# Patient Record
Sex: MALE | Race: WHITE | URBAN - METROPOLITAN AREA
[De-identification: names, ages, dates, MRNs, and addresses within clinical notes are randomized per-mention and may not be internally consistent; named-entity substitution may affect disease eponyms.]

---

## 2023-04-13 ENCOUNTER — TELEPHONE (OUTPATIENT)
Facility: CLINIC | Age: 38
End: 2023-04-13

## 2023-04-13 NOTE — TELEPHONE ENCOUNTER
No RN in clinic to phone pt today. I have not met this patient but called just as a general recommendation that if he is having severe abdominal pain or flank pain he should seek care at PCP/urgent care/ER, I cannot address that in our visit for diabetes concern. He states understanding and says 'I think i'll be okay', and has OV scheduled for 4/17.  Will close encounter

## 2023-04-13 NOTE — TELEPHONE ENCOUNTER
Pts wife Maida Butler phoned clinic to schedule an appt. States  is experiencing constant pain in his left flank. They purchased a glucometer yesterday and began checking his BG. Was experiencing blurred vision but has since gone away. Has athletes foot and is getting worse despite treatment (OTC creams). Yesterday checked his fasting BG was at 136 and he had a meal, pork chop asparagus, two hours later BG was 165. Fasted this AM checked and was 201. Pts wife states he has not been seen by any health provider in years likely since childhood. No PCP. Not officially diagnosed with diabetes but runs in the family. Scheduled pt with EMERSON Becerra on 4/17 at 2:45 pm but may need to reschedule to 4/19. Call back number is [phone].

## 2023-04-17 ENCOUNTER — OFFICE VISIT (OUTPATIENT)
Facility: CLINIC | Age: 38
End: 2023-04-17
Payer: COMMERCIAL

## 2023-04-17 ENCOUNTER — LAB ENCOUNTER (OUTPATIENT)
Dept: LAB | Facility: HOSPITAL | Age: 38
End: 2023-04-17
Payer: COMMERCIAL

## 2023-04-17 VITALS
SYSTOLIC BLOOD PRESSURE: 142 MMHG | WEIGHT: 267 LBS | HEART RATE: 78 BPM | DIASTOLIC BLOOD PRESSURE: 80 MMHG | OXYGEN SATURATION: 98 %

## 2023-04-17 DIAGNOSIS — E11.65 TYPE 2 DIABETES MELLITUS WITH HYPERGLYCEMIA, WITHOUT LONG-TERM CURRENT USE OF INSULIN (HCC): ICD-10-CM

## 2023-04-17 DIAGNOSIS — E11.65 TYPE 2 DIABETES MELLITUS WITH HYPERGLYCEMIA, WITHOUT LONG-TERM CURRENT USE OF INSULIN (HCC): Primary | ICD-10-CM

## 2023-04-17 LAB
ALBUMIN SERPL-MCNC: 4.3 G/DL (ref 3.4–5)
ALBUMIN/GLOB SERPL: 1.2 {RATIO} (ref 1–2)
ALP LIVER SERPL-CCNC: 76 U/L
ALT SERPL-CCNC: 27 U/L
ANION GAP SERPL CALC-SCNC: 4 MMOL/L (ref 0–18)
AST SERPL-CCNC: 18 U/L (ref 15–37)
BILIRUB SERPL-MCNC: 0.7 MG/DL (ref 0.1–2)
BUN BLD-MCNC: 10 MG/DL (ref 7–18)
CALCIUM BLD-MCNC: 9.4 MG/DL (ref 8.5–10.1)
CARTRIDGE LOT#: ABNORMAL NUMERIC
CHLORIDE SERPL-SCNC: 106 MMOL/L (ref 98–112)
CHOLEST SERPL-MCNC: 163 MG/DL (ref ?–200)
CO2 SERPL-SCNC: 27 MMOL/L (ref 21–32)
CREAT BLD-MCNC: 0.95 MG/DL
CREAT UR-SCNC: 126 MG/DL
ERYTHROCYTE [DISTWIDTH] IN BLOOD BY AUTOMATED COUNT: 12.2 %
GFR SERPLBLD BASED ON 1.73 SQ M-ARVRAT: 107 ML/MIN/1.73M2 (ref 60–?)
GLOBULIN PLAS-MCNC: 3.7 G/DL (ref 2.8–4.4)
GLUCOSE BLD-MCNC: 117 MG/DL (ref 70–99)
HCT VFR BLD AUTO: 44.3 %
HDLC SERPL-MCNC: 37 MG/DL (ref 40–59)
HEMOGLOBIN A1C: 7.9 % (ref 4.3–5.6)
HGB BLD-MCNC: 15.9 G/DL
LDLC SERPL CALC-MCNC: 101 MG/DL (ref ?–100)
MCH RBC QN AUTO: 30.1 PG (ref 26–34)
MCHC RBC AUTO-ENTMCNC: 35.9 G/DL (ref 31–37)
MCV RBC AUTO: 83.7 FL
MICROALBUMIN UR-MCNC: 0.75 MG/DL
MICROALBUMIN/CREAT 24H UR-RTO: 6 UG/MG (ref ?–30)
NONHDLC SERPL-MCNC: 126 MG/DL (ref ?–130)
OSMOLALITY SERPL CALC.SUM OF ELEC: 284 MOSM/KG (ref 275–295)
PLATELET # BLD AUTO: 232 10(3)UL (ref 150–450)
POTASSIUM SERPL-SCNC: 3.9 MMOL/L (ref 3.5–5.1)
PROT SERPL-MCNC: 8 G/DL (ref 6.4–8.2)
RBC # BLD AUTO: 5.29 X10(6)UL
SODIUM SERPL-SCNC: 137 MMOL/L (ref 136–145)
TRIGL SERPL-MCNC: 139 MG/DL (ref 30–149)
TSI SER-ACNC: 3.32 MIU/ML (ref 0.36–3.74)
VLDLC SERPL CALC-MCNC: 23 MG/DL (ref 0–30)
WBC # BLD AUTO: 7.6 X10(3) UL (ref 4–11)

## 2023-04-17 PROCEDURE — 82570 ASSAY OF URINE CREATININE: CPT

## 2023-04-17 PROCEDURE — 80053 COMPREHEN METABOLIC PANEL: CPT

## 2023-04-17 PROCEDURE — 82043 UR ALBUMIN QUANTITATIVE: CPT

## 2023-04-17 PROCEDURE — 84443 ASSAY THYROID STIM HORMONE: CPT

## 2023-04-17 PROCEDURE — 36415 COLL VENOUS BLD VENIPUNCTURE: CPT

## 2023-04-17 PROCEDURE — 85027 COMPLETE CBC AUTOMATED: CPT

## 2023-04-17 PROCEDURE — 80061 LIPID PANEL: CPT

## 2023-04-17 PROCEDURE — 3061F NEG MICROALBUMINURIA REV: CPT | Performed by: NURSE PRACTITIONER

## 2023-05-02 ENCOUNTER — OFFICE VISIT (OUTPATIENT)
Dept: FAMILY MEDICINE CLINIC | Facility: CLINIC | Age: 38
End: 2023-05-02
Payer: COMMERCIAL

## 2023-05-02 VITALS
WEIGHT: 275.63 LBS | OXYGEN SATURATION: 98 % | BODY MASS INDEX: 39.46 KG/M2 | DIASTOLIC BLOOD PRESSURE: 88 MMHG | HEIGHT: 70 IN | TEMPERATURE: 97 F | HEART RATE: 69 BPM | SYSTOLIC BLOOD PRESSURE: 138 MMHG

## 2023-05-02 DIAGNOSIS — R19.09 ABDOMINAL MASS OF OTHER SITE: Primary | ICD-10-CM

## 2023-05-02 DIAGNOSIS — R03.0 ELEVATED BP WITHOUT DIAGNOSIS OF HYPERTENSION: ICD-10-CM

## 2023-05-02 PROBLEM — E11.9 DIABETES TYPE 2, CONTROLLED (HCC): Status: ACTIVE | Noted: 2023-05-02

## 2023-05-02 PROCEDURE — 3079F DIAST BP 80-89 MM HG: CPT | Performed by: NURSE PRACTITIONER

## 2023-05-02 PROCEDURE — 3075F SYST BP GE 130 - 139MM HG: CPT | Performed by: NURSE PRACTITIONER

## 2023-05-02 PROCEDURE — 3008F BODY MASS INDEX DOCD: CPT | Performed by: NURSE PRACTITIONER

## 2023-05-02 PROCEDURE — 99214 OFFICE O/P EST MOD 30 MIN: CPT | Performed by: NURSE PRACTITIONER

## 2023-05-02 NOTE — PROGRESS NOTES
Subjective:   Patient ID: Franchester Holstein is a 30year old male. Jennifer Vance is here today to establish care. He had blurry vision, which led him to endocrinology d/t an A1C of 7.9%. Has adjusted lifestyle and diet. Has appointment with endo in July of 2023. Left side lower abdominal mass. Began 5 months ago. Described as tender. 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 Denies recent falls or changes in bowel patterns. History/Other:   Review of Systems   All other systems reviewed and are negative. No current outpatient medications on file. Allergies:No Known Allergies    Objective:   Physical Exam  Constitutional:       Appearance: Normal appearance. He is obese. Eyes:      General: No scleral icterus. Pupils: Pupils are equal, round, and reactive to light. Cardiovascular:      Rate and Rhythm: Normal rate and regular rhythm. Heart sounds: Normal heart sounds. No murmur heard. Pulmonary:      Effort: Pulmonary effort is normal.      Breath sounds: Normal breath sounds. No wheezing. Abdominal:      General: There is no distension. Palpations: Abdomen is soft. There is mass. Tenderness: There is abdominal tenderness. There is no right CVA tenderness, left CVA tenderness, guarding or rebound. Hernia: No hernia is present. Comments: Left oblique with oblong semi-firm mass, well-defined borders. Tender. Neurological:      Mental Status: He is alert and oriented to person, place, and time. Psychiatric:         Mood and Affect: Mood normal.         Behavior: Behavior normal.         Thought Content: Thought content normal.         Judgment: Judgment normal.         Assessment & Plan:      1. Abdominal mass of other site  Hernia vs. Hypertrophy of muscle  - US ABDOMEN LIMITED (CPT=76705); Future    2.  Elevated BP without diagnosis of hypertension  Check blood pressures when at grocery store or buy at home cuff  Follow-up in 1 month, earlier if worsening bp level  Continue lifestyle modifications

## 2023-06-07 ENCOUNTER — HOSPITAL ENCOUNTER (OUTPATIENT)
Dept: ULTRASOUND IMAGING | Age: 38
Discharge: HOME | End: 2023-06-07
Attending: NURSE PRACTITIONER
Payer: COMMERCIAL

## 2023-06-07 DIAGNOSIS — R19.09 ABDOMINAL MASS OF OTHER SITE: ICD-10-CM

## 2023-06-07 PROCEDURE — 76705 ECHO EXAM OF ABDOMEN: CPT | Performed by: NURSE PRACTITIONER

## 2023-07-06 ENCOUNTER — OFFICE VISIT (OUTPATIENT)
Facility: CLINIC | Age: 38
End: 2023-07-06
Payer: COMMERCIAL

## 2023-07-06 VITALS — OXYGEN SATURATION: 99 % | DIASTOLIC BLOOD PRESSURE: 80 MMHG | HEART RATE: 72 BPM | SYSTOLIC BLOOD PRESSURE: 130 MMHG

## 2023-07-06 DIAGNOSIS — E11.65 TYPE 2 DIABETES MELLITUS WITH HYPERGLYCEMIA, WITHOUT LONG-TERM CURRENT USE OF INSULIN (HCC): Primary | ICD-10-CM

## 2023-07-06 LAB
CARTRIDGE LOT#: NORMAL NUMERIC
HEMOGLOBIN A1C: 5.6 % (ref 4.3–5.6)

## 2023-07-06 PROCEDURE — 99214 OFFICE O/P EST MOD 30 MIN: CPT

## 2023-07-06 PROCEDURE — 3079F DIAST BP 80-89 MM HG: CPT

## 2023-07-06 PROCEDURE — 3044F HG A1C LEVEL LT 7.0%: CPT

## 2023-07-06 PROCEDURE — 83036 HEMOGLOBIN GLYCOSYLATED A1C: CPT

## 2023-07-06 PROCEDURE — 3075F SYST BP GE 130 - 139MM HG: CPT

## 2023-07-06 NOTE — PROGRESS NOTES
[facility]    Name: Ger Gutierrez    Date: 7/6/2023    HISTORY OF PRESENT ILLNESS   Ger Gutierrez is a 30year old male with no significant PMHx who presents for follow up evaluation for diabetes. Initial HPI consult in April 2023: No previous lab work done, has not been to primary care provider for all of adult life  A1C in office 7.9%; confirms T2DM dx  Diagnosed age: 30  Pt denies hx of hospitalization for DM and/or pancreatitis   Family history of DM: Mother  FBG at home have been 160s. Having blurred vision- hard to describe, but its like his eyes aren't focused  Has been having L flank pain- describes it as intermittent, dull, nothing makes it better/worse- it's been happening less lately  Notes that this started after eating a keto/high fat diet, now no longer eating keto    Current DM meds: no current meds  Previously trialed/failed: none    Diet recall: Mostly eats all at once in the evening, is fasting most of day, but states he eats a lot at night  Previously eating burgers, lots of carbs, meats/potatoes, lots of snacking through the night (chips, cookies)  Works 4a-4p, working 6 days/week, has 18mo old child at home    Blood Glucose log/CGM: reviewed   Checking 1 times per day  Morning/fasting: 160s    Interim hx:  July 2023- A1C 5.6% today. Changed diet, eating Chipotle bowls. Diet recall: Eating mostly meat, vegetables, hard boiled egg/cheese stick, mostly keto diet. Checking BG in AM, -120s, 2hPP 99 w/ chipotle, 5 [place] is 120s-140s. 90 day average is 130. Walking more with the dog. Considering starting going to the gym. Last A1c value was 5.6% done 7/6/2023. REVIEW OF SYSTEMS  Ten point review of systems has been performed and is otherwise negative and/or non-contributory, except as described above. Medications:   No current outpatient medications on file.      Allergies:   No Known Allergies    Social History:   Social History    Socioeconomic History Marital status: Single      Number of children: 1    Tobacco Use      Smoking status: Never      Smokeless tobacco: Never    Vaping Use      Vaping Use: Never used    Substance and Sexual Activity      Alcohol use: Never      Drug use: Never      Medical History:   No past medical history on file. Surgical history:   Past Surgical History:   Procedure Laterality Date    I&D PERIANAL ABSCESS,SUPERFICIAL           PHYSICAL EXAM   07/06/23  1014   BP: 130/80   Pulse: 72   SpO2: 99%       General Appearance:  alert, well developed, in no acute distress  Eyes:  normal conjunctivae, sclera  Respiratory:  breathing comfortably  Cardiovascular:  regular rate  Musculoskeletal:  normal muscle strength and tone  Skin:  normal moisture and skin texture  Hair & Nails:  normal scalp hair     Neuro:  sensory grossly intact and motor grossly intact  Psychiatric:  oriented to time, self, and place    ASSESSMENT/PLAN:    (E11.65) Type 2 diabetes mellitus with hyperglycemia, without long-term current use of insulin (HCC)  (primary encounter diagnosis)  Plan: HEMOGLOBIN A1C, HEMOGLOBIN A1C    A1C 5.6%! at goal <7% w/ just lifestyle adjustments. Further counseling on diet/exercise provided. Plan to f/u A1C 3 mo (no appt needed unless >7%), then will f/u in 6mo to track progress. We discussed the importance of glycemic control to prevent complications of diabetes. We discussed the importance of SBGM and consistent, low carb diet as well as moderate exercise as well.   We also discussed the complications of diabetes include retinopathy, neuropathy, nephropathy and cardiovascular disease.     - repeat A1C after 10/1 at any [facility] lab location  - advised to check BG once daily at alternating times of day  - incr exercise to 150min/week, carb controlled diet (aim for 45-60g carb/meal)  - DM education ordered LOV, politely declines today  - Ophtho: no DR, No data recorded No data recorded - reminded  - BP controlled, on no current antihypertensives  - LDL mildly elevated, lifestyle changes   - MAB neg  - Neuropathy/ Foot exam: No data recorded  - CAD: none     Return in about 6 months (around 1/6/2024) for recheck A1C at visit. Patient instructed to call sooner if they develop blood glucose readings <70 and/or if they have readings persistently >400. A total of 30 minutes was spent today on obtaining history, reviewing pertinent labs, evaluating patient, providing multiple treatment options, reinforcing diet/exercise and compliance, and completing documentation.      7/6/2023  EMERSON Romano

## 2024-01-03 ENCOUNTER — OFFICE VISIT (OUTPATIENT)
Facility: CLINIC | Age: 39
End: 2024-01-03
Payer: COMMERCIAL

## 2024-01-03 VITALS
WEIGHT: 250 LBS | OXYGEN SATURATION: 98 % | HEART RATE: 77 BPM | SYSTOLIC BLOOD PRESSURE: 126 MMHG | DIASTOLIC BLOOD PRESSURE: 78 MMHG | BODY MASS INDEX: 36 KG/M2

## 2024-01-03 DIAGNOSIS — E11.9 TYPE 2 DIABETES MELLITUS WITHOUT COMPLICATION, WITHOUT LONG-TERM CURRENT USE OF INSULIN (HCC): Primary | ICD-10-CM

## 2024-01-03 LAB
CARTRIDGE LOT#: NORMAL NUMERIC
GLUCOSE BLOOD: 120
HEMOGLOBIN A1C: 5.5 % (ref 4.3–5.6)

## 2024-01-03 PROCEDURE — 83036 HEMOGLOBIN GLYCOSYLATED A1C: CPT

## 2024-01-03 PROCEDURE — 82947 ASSAY GLUCOSE BLOOD QUANT: CPT

## 2024-01-03 PROCEDURE — 3074F SYST BP LT 130 MM HG: CPT

## 2024-01-03 PROCEDURE — 3078F DIAST BP <80 MM HG: CPT

## 2024-01-03 PROCEDURE — 99213 OFFICE O/P EST LOW 20 MIN: CPT

## 2024-01-03 PROCEDURE — 3044F HG A1C LEVEL LT 7.0%: CPT

## 2024-01-03 NOTE — PROGRESS NOTES
[facility]    Name: Kyle Holm    Date: 1/3/2024    HISTORY OF PRESENT ILLNESS   Kyle Holm is a 38 year old male with no significant PMHx who presents for follow up evaluation for diabetes.    Initial HPI consult in April 2023:  No previous lab work done, has not been to primary care provider for all of adult life  A1C in office 7.9%; confirms T2DM dx  Diagnosed age: 37  Pt denies hx of hospitalization for DM and/or pancreatitis   Family history of DM: Mother  FBG at home have been 160s. Having blurred vision- hard to describe, but its like his eyes aren't focused  Has been having L flank pain- describes it as intermittent, dull, nothing makes it better/worse- it's been happening less lately  Notes that this started after eating a keto/high fat diet, now no longer eating keto    Current DM meds: no current meds  Previously trialed/failed: none     Diet recall: Mostly eats all at once in the evening, is fasting most of day, but states he eats a lot at night  Previously eating burgers, lots of carbs, meats/potatoes, lots of snacking through the night (chips, cookies)  Works 4a-4p, working 6 days/week, has 18mo old child at home    Blood Glucose log/CGM: reviewed   Checking 1 times per day  Morning/fasting: 160s    Interim hx:  July 2023- A1C 5.6% today. Changed diet, eating Chipotle bowls. Diet recall: Eating mostly meat, vegetables, hard boiled egg/cheese stick, mostly keto diet. Checking BG in AM, -120s, 2hPP 99 w/ chipotle, 5 [place] is 120s-140s. 90 day average is 130. Walking more with the dog. Considering starting going to the gym.      Jan 2024-  Last A1c value was 5.5% done 1/3/2024.  Random , ate cereal last night before bed. He notes he is eating more carbs  Goal to exercise more in the coming year    Last A1c value was 5.5% done 1/3/2024.    REVIEW OF SYSTEMS  Ten point review of systems has been performed and is otherwise negative and/or non-contributory, except as  described above.     Medications:   No current outpatient medications on file.     Allergies:   No Known Allergies    Social History:   Social History     Socioeconomic History    Marital status: Single    Number of children: 1   Tobacco Use    Smoking status: Never    Smokeless tobacco: Never   Vaping Use    Vaping Use: Never used   Substance and Sexual Activity    Alcohol use: Never    Drug use: Never       Medical History:   History reviewed. No pertinent past medical history.    Surgical history:   Past Surgical History:   Procedure Laterality Date    I&D PERIANAL ABSCESS,SUPERFICIAL           PHYSICAL EXAM  Vitals:    01/03/24 1038   BP: 126/78   Pulse: 77   SpO2: 98%   Weight: 250 lb (113.4 kg)     General Appearance:  alert, well developed, in no acute distress  Eyes:  normal conjunctivae, sclera  Respiratory:  breathing comfortably  Musculoskeletal:  normal muscle strength and tone  Skin:  normal moisture and skin texture  Hair & Nails:  normal scalp hair     Neuro:  sensory grossly intact and motor grossly intact  Psychiatric:  oriented to time, self, and place  Diabetes foot exam performed: Bilateral foot exam w/ normal skin temperature and turgor. He has a healing callous on the R medial foot. Monofilament/sensation of bilateral feet is normal. Vibration to dorsum to the first toe perceived bilaterally. Bilateral dorsalis pedis +1. Capillary refill <3 seconds. Foot care practices reviewed with patient.      ASSESSMENT/PLAN:      ICD-10-CM    1. Type 2 diabetes mellitus without complication, without long-term current use of insulin ([facility])  E11.9 POC Hgb A1C     HemoCue Glucose 201 (Glucose blood test)          A1C 5.5%, at goal <7% w/ just lifestyle adjustments. Further counseling on diet/exercise provided. F/u in 6mo to track progress.   Discussed DC to PCP, prefers to follow here.    We discussed the importance of glycemic control to prevent complications of diabetes. We discussed the importance of SBGM  and consistent, low carb diet as well as moderate exercise as well.  We also discussed the complications of diabetes include retinopathy, neuropathy, nephropathy and cardiovascular disease.     - advised to check BG once daily at alternating times of day  - incr exercise to 150min/week, carb controlled diet (aim for 45-60g carb/meal)  - DM education ordered LOV, politely declines today    DM quality metrics:  - Ophtho: No data recorded No data recorded - reminded  - BP controlled, on no current antihypertensives  - LDL mildly elevated, lifestyle changes 4/2023  - MAB neg 4/2023   - Neuropathy/ Foot exam: Last Foot Exam: 01/03/24  - CAD: none     Return in about 6 months (around 7/3/2024) for DM follow up.    1/3/2024  EMERSON Mata

## 2024-01-03 NOTE — PATIENT INSTRUCTIONS
- complete your diabetes eye exam. This exam is critical to preventing and treating eye conditions caused by diabetes that could potentially lead to vision loss. Once complete, please call your eye care provider and ask them to fax your latest report to our office. This will help us update our records. Our fax number is: [phone]       Blood sugar targets:  Before breakfast: , 2 hours after meals: <180 (preferably <150), A1C goal: <7.0%    HOW TO TREAT LOW BLOOD SUGAR (Hypoglycemia)  Low blood sugar= Less than 70, or if you start to have symptoms (below)  Symptoms: Shaking or trembling, fast heart rate, extreme hunger, sweating, confusion/difficulty concentrating, dizziness.    How to treat a low blood sugar if you are able to eat/drink: The Rule of 15  If you are using continuous glucose monitor that says you are low, but you do not have any symptoms, verify on fingerstick that your blood sugar is actually low before treating.   Eat 15 grams of carbs (see examples below)  Check your blood sugar after 15 minutes. If it’s still below your target range, have another serving.   Repeat these steps until it’s in your target range. Once it’s in range, if you're nervous about your sugar going low again, have a protein source (ie, a spoonful of peanut butter).   If you have a CGM you want to look for how your arrow has changed. If you arrow is pointed up or sideways after 15 min, give your CGM more time OR check with a finger stick. Try not to eat more food until at least 15 min after the first BG check - otherwise you risk having a rebound high.  If you are experiencing symptoms and you are unable to check your blood glucose for any reason, treat the hypoglycemia.  If someone has a low blood sugar and is unconscious: Don’t hesitate to call 911. If someone is unconscious and glucagon is not available or someone does not know how to use it, call 911 immediately.    To treat a low, I recommend you carry with you  easy, pre-portioned treatment for low blood sugars that are 15G of carbs:   - Children sized squeeze pouch applesauce (high fiber + carbs help prevent too high of a spike)  - Small children's sized juicebox- 15g carb --> 4oz juice box  - Glucose tablets from [employer], you can find them near diabetes supplies --> Note, you will need to eat 3-4 tablets to get to 15g of carbs  - Children sized fruit snack pack- look for one with 15 grams of total carbohydrate  - 3-4 Starburst candies  - 1 pack of fun sized skittles  - Choice of how to treat your low is important. Complex carbs, or foods that containf ats along with carbs (like chocolate) can slow the absorption of glucose and should NOT be used to treat an emergency low      Diabetes annual care reminders:  - If you haven't, remember to complete a yearly diabetes eye exam. This exam is critical to preventing and treating eye conditions caused by diabetes that could potentially lead to vision loss. Once complete, please call your eye care provider and ask them to fax your latest report to our office. This will help us update our records. Our fax number is: [phone]  - If you ever have an upcoming surgery, please notify the office. We will make adjustments to your diabetes medications prior to the procedure.    Diet & Exercise:  Helpful apps for Carb Counting: remember, carb goal is 30-45g of carb per meal, plus 15-30g of carbs with snacks.   Increase your protein and fiber intake to feel cabrera, longer.   If you want more dietary guidance, let me know and we can get you scheduled with Danielle, our diabetes educator or a dietician who specializes in diabetes  [facility]- Calorie counter and diet tracker, [place] food search franny or www.calorieking.com, LoseIt!, Carb Manager  Nutrition analyzer: Copy and paste any recipe into this analyzer, it will give you an estimated carb count for homemade  recipes:  https://www.[place]/recipe-nutrition-analyzer-3359152    General exercise guidelines from the [employer]:  Recommendations for Adults: Get at least 150 minutes per week of moderate-intensity aerobic activity or 75 minutes per week of vigorous aerobic activity, or a combination of both, preferably spread throughout the week.  Add moderate- to high-intensity muscle-strengthening activity (such as resistance or weights) on at least 2 days per week.  Spend less time sitting. Even light-intensity activity can offset some of the risks of being sedentary. Taking a 10-15 minute walk after meals is very beneficial to blood sugar regulation.  Increase amount and intensity of exercise gradually over time.      Thank you for visiting our office. We look forward to working with you to reach your health goals. As a reminder, if you need refills, please request early so there is enough time to process the request. We ask that you provide at least 5 days' notice before a refill is due, so there is time to send a request to pharmacy, process the refill, and ensure there are no other problems with obtaining the medication (backorders, prior authorization paperwork, etc). Routine refills will not be addressed on weekends, so please submit these requests during the week.

## 2024-08-20 ENCOUNTER — TELEPHONE (OUTPATIENT)
Facility: CLINIC | Age: 39
End: 2024-08-20

## 2024-08-22 ENCOUNTER — OFFICE VISIT (OUTPATIENT)
Facility: CLINIC | Age: 39
End: 2024-08-22
Payer: COMMERCIAL

## 2024-08-22 VITALS
HEIGHT: 70 IN | SYSTOLIC BLOOD PRESSURE: 130 MMHG | HEART RATE: 63 BPM | BODY MASS INDEX: 37.8 KG/M2 | OXYGEN SATURATION: 99 % | WEIGHT: 264 LBS | DIASTOLIC BLOOD PRESSURE: 86 MMHG

## 2024-08-22 DIAGNOSIS — R73.03 PREDIABETES: Primary | ICD-10-CM

## 2024-08-22 LAB — HEMOGLOBIN A1C: 5.6 % (ref 4.3–5.6)

## 2024-08-22 PROCEDURE — 3044F HG A1C LEVEL LT 7.0%: CPT

## 2024-08-22 PROCEDURE — 3079F DIAST BP 80-89 MM HG: CPT

## 2024-08-22 PROCEDURE — 3075F SYST BP GE 130 - 139MM HG: CPT

## 2024-08-22 PROCEDURE — 99213 OFFICE O/P EST LOW 20 MIN: CPT

## 2024-08-22 PROCEDURE — 83036 HEMOGLOBIN GLYCOSYLATED A1C: CPT

## 2024-08-22 PROCEDURE — 3008F BODY MASS INDEX DOCD: CPT

## 2024-08-22 NOTE — PROGRESS NOTES
[facility]    Name: Kyle Holm    Date: 8/22/2024    HISTORY OF PRESENT ILLNESS   Kyle Holm is a 38 year old male with no significant PMHx who presents for follow up evaluation for diabetes.    Initial HPI consult in April 2023:  No previous lab work done, has not been to primary care provider for all of adult life  A1C in office 7.9%; confirms T2DM dx  Diagnosed age: 37  Pt denies hx of hospitalization for DM and/or pancreatitis   Family history of DM: Mother  FBG at home have been 160s. Having blurred vision- hard to describe, but its like his eyes aren't focused  Has been having L flank pain- describes it as intermittent, dull, nothing makes it better/worse- it's been happening less lately  Notes that this started after eating a keto/high fat diet, now no longer eating keto    Current DM meds: no current meds  Previously trialed/failed: none     Diet recall: Mostly eats all at once in the evening, is fasting most of day, but states he eats a lot at night  Previously eating burgers, lots of carbs, meats/potatoes, lots of snacking through the night (chips, cookies)  Works 4a-4p, working 6 days/week, has 18mo old child at home    Blood Glucose log/CGM: reviewed   Checking 1 times per day  Morning/fasting: 160s    Interim hx:  July 2023- A1C 5.6% today. Changed diet, eating Chipotle bowls. Diet recall: Eating mostly meat, vegetables, hard boiled egg/cheese stick, mostly keto diet. Checking BG in AM, -120s, 2hPP 99 w/ to[place], 5 [place] is 120s-140s. 90 day average is 130. Walking more with the dog. Considering starting going to the gym.      Jan 2024-  Last A1c value was 5.5% done 1/3/2024.  Random , ate cereal last night before bed. He notes he is eating more carbs  Goal to exercise more in the coming year    8/2024-w/ Mariam NORMAN.Last A1c value was 5.6% done 8/22/2024.  He notes now that wife is pregnant, eating some more snacks out of the usual. Eating more carbs.   Has been more  active this summer. Checks BG 1x/day usually- 130s  DM meds at visit: NONE    Objective:    REVIEW OF SYSTEMS  Ten point review of systems has been performed and is otherwise negative and/or non-contributory, except as described above.     Medications:   No current outpatient medications on file.     Allergies:   No Known Allergies    Social History:   Social History     Socioeconomic History    Marital status: Single    Number of children: 1   Tobacco Use    Smoking status: Never    Smokeless tobacco: Never   Vaping Use    Vaping status: Never Used   Substance and Sexual Activity    Alcohol use: Never    Drug use: Never       Medical History:   History reviewed. No pertinent past medical history.    Surgical history:   Past Surgical History:   Procedure Laterality Date    I&d perianal abscess,superficial           PHYSICAL EXAM  Vitals:    08/22/24 1420   BP: 130/86   BP Location: Left arm   Patient Position: Sitting   Cuff Size: adult   Pulse: 63   SpO2: 99%   Weight: 264 lb (119.7 kg)   Height: 5' 10\" (1.778 m)     General Appearance:  alert, well developed, in no acute distress  Eyes:  normal conjunctivae, sclera  Respiratory:  breathing comfortably  Musculoskeletal:  normal muscle strength and tone  Skin:  normal moisture and skin texture  Hair & Nails:  normal scalp hair     Neuro:  sensory grossly intact and motor grossly intact  Psychiatric:  oriented to time, self, and place      Assessment & Plan:   (R73.03) Prediabetes  (primary encounter diagnosis)  Plan:  Initially presented to endo office in April 2023 with blurred vision, had not seen PCP; at that time A1C in office 7.9%. He initiated several lifestyle changes over the last year and essentially reverse his DM.  Last A1c value was 5.6% done 8/22/2024, at goal <7% w/ just lifestyle adjustments.      We discussed the importance of glycemic control to prevent complications of diabetes. We discussed the importance of SBGM and consistent, low carb diet as  well as moderate exercise as well.  We also discussed the complications of diabetes include retinopathy, neuropathy, nephropathy and cardiovascular disease.     - advised to check BG once daily at alternating times of day  - incr exercise to 150min/week, carb controlled diet (aim for 45-60g carb/meal)  - DM education politely declined    DM quality metrics:  - Ophtho: Last Dilated Eye Exam: 08/20/24   Eye Exam shows Diabetic Retinopathy?: No  - BP controlled, on no current antihypertensives  - LDL mildly elevated, lifestyle changes 4/2023  - MAB neg 4/2023 - he will repeat with his PCP  - Neuropathy/ Foot exam: Last Foot Exam: 01/03/24  - CAD: none     Follow up: No need to schedule at this time, he is doing well. Appropriate to discharge to PCP office. Please return to endo office if DM control worsens or fails to improve.     8/22/2024  EMERSON Mata

## 2025-02-24 ENCOUNTER — TELEPHONE (OUTPATIENT)
Facility: CLINIC | Age: 40
End: 2025-02-24

## 2025-02-24 NOTE — TELEPHONE ENCOUNTER
Received via mail a health consideration for patient. Stating \"potential gap in care: no diabetes therapy in the past 4 months\".     Per last office visit note on 8/22/24: \"Follow up: No need to schedule at this time, he is doing well. Appropriate to discharge to PCP office. Please return to endo office if DM control worsens or fails to improve.\"     Pt was discharged to PCP office for further management.